# Patient Record
Sex: MALE | Race: WHITE | NOT HISPANIC OR LATINO | Employment: OTHER | URBAN - METROPOLITAN AREA
[De-identification: names, ages, dates, MRNs, and addresses within clinical notes are randomized per-mention and may not be internally consistent; named-entity substitution may affect disease eponyms.]

---

## 2021-11-16 ENCOUNTER — OFFICE VISIT (OUTPATIENT)
Dept: INTERNAL MEDICINE | Facility: CLINIC | Age: 46
End: 2021-11-16

## 2021-11-16 VITALS
HEART RATE: 84 BPM | BODY MASS INDEX: 34.07 KG/M2 | DIASTOLIC BLOOD PRESSURE: 52 MMHG | TEMPERATURE: 98.4 F | WEIGHT: 274 LBS | HEIGHT: 75 IN | SYSTOLIC BLOOD PRESSURE: 94 MMHG

## 2021-11-16 DIAGNOSIS — Z13.0 SCREENING FOR DEFICIENCY ANEMIA: ICD-10-CM

## 2021-11-16 DIAGNOSIS — Z76.89 ENCOUNTER TO ESTABLISH CARE: ICD-10-CM

## 2021-11-16 DIAGNOSIS — M54.50 LUMBAR BACK PAIN: ICD-10-CM

## 2021-11-16 DIAGNOSIS — Z13.29 SCREENING FOR ENDOCRINE DISORDER: ICD-10-CM

## 2021-11-16 DIAGNOSIS — K21.9 GASTROESOPHAGEAL REFLUX DISEASE, UNSPECIFIED WHETHER ESOPHAGITIS PRESENT: ICD-10-CM

## 2021-11-16 DIAGNOSIS — L30.9 DERMATITIS: ICD-10-CM

## 2021-11-16 DIAGNOSIS — M72.2 PLANTAR FASCIITIS: Primary | ICD-10-CM

## 2021-11-16 DIAGNOSIS — Z13.220 LIPID SCREENING: ICD-10-CM

## 2021-11-16 DIAGNOSIS — Z86.69 HX OF MIGRAINES: ICD-10-CM

## 2021-11-16 PROBLEM — M48.02 CERVICAL SPINAL STENOSIS: Status: ACTIVE | Noted: 2021-11-16

## 2021-11-16 PROBLEM — N20.0 KIDNEY STONES: Status: ACTIVE | Noted: 2021-11-16

## 2021-11-16 PROCEDURE — 99203 OFFICE O/P NEW LOW 30 MIN: CPT | Performed by: NURSE PRACTITIONER

## 2021-11-16 RX ORDER — ELETRIPTAN HYDROBROMIDE 40 MG/1
40 TABLET, FILM COATED ORAL ONCE AS NEEDED
COMMUNITY
End: 2022-05-12 | Stop reason: SDUPTHER

## 2021-11-16 RX ORDER — LORATADINE 10 MG/1
10 TABLET ORAL DAILY
Qty: 90 TABLET | Refills: 1 | Status: SHIPPED | OUTPATIENT
Start: 2021-11-16

## 2021-11-16 RX ORDER — ERENUMAB-AOOE 140 MG/ML
140 INJECTION, SOLUTION SUBCUTANEOUS
Qty: 1.12 ML | Refills: 11 | Status: SHIPPED | OUTPATIENT
Start: 2021-11-16

## 2021-11-16 RX ORDER — PANTOPRAZOLE SODIUM 20 MG/1
20 TABLET, DELAYED RELEASE ORAL 2 TIMES DAILY PRN
COMMUNITY
End: 2022-03-29 | Stop reason: SDUPTHER

## 2021-11-16 RX ORDER — ONDANSETRON 4 MG/1
4 TABLET, FILM COATED ORAL EVERY 8 HOURS PRN
Qty: 30 TABLET | Refills: 0 | Status: SHIPPED | OUTPATIENT
Start: 2021-11-16

## 2021-11-16 RX ORDER — ERENUMAB-AOOE 140 MG/ML
140 INJECTION, SOLUTION SUBCUTANEOUS
COMMUNITY
End: 2021-11-16 | Stop reason: SDUPTHER

## 2021-11-16 RX ORDER — PROMETHAZINE HYDROCHLORIDE 25 MG/1
25 TABLET ORAL EVERY 6 HOURS PRN
COMMUNITY
End: 2021-12-10 | Stop reason: SDUPTHER

## 2021-11-16 NOTE — PROGRESS NOTES
Abiel Walters  1975  [id number]  Patient Care Team:  Celina eRmy APRN as PCP - General (Internal Medicine)    Abiel Walters is a 46 y.o. here today to establish care.     Chief Complaint   Patient presents with   • Plantar Fasciitis     Patientis here to establish care        HPI:   Abiel is here to establish care.  He has been in [facility] for the past 5 years and got out 3 wks ago.  He has a pertinent medical history of plantar fasciitis, GERD, migraines, lumbar back pain, cervical stenosis, and obesity.    PF: Left heel pain started about 12 mo ago, after 6 mo had injection which gave him relief for about 4.5 mo.  Has had increased pain recently.    GERD: Initially on Protonix 40 mg and has been able to decrease Protonix to 20mg and only needs 4 d/wk right now.  Some breakthrough symptoms he treats with tums    Long standing history of migraine headaches.  Has been on Aimovig for the last 12 to 18 months which has significantly improved both the frequency and intensity of his headaches.  He was having about 20 headaches per month and now 5 to 6/month.  Uses Relpax for breakthrough.  Sometimes needs Phenergan.  Has used Zofran in the past with less sedation and good results.  If resistant to Relpax will do Toradol injection.  In the past was in the ED 2-3 times a month for migraine treatment.    He would like a prescription for an antihistamine to treat allergies.    Cervical stenosis: No past surgeries or interventions    Lumbar back pain, was previously on Percocet 10 mg 4 times daily for about 18 months before he moved present.  Has been using only Tylenol for the last 5 years, does have significant pain.  Meloxicam was not beneficial in the past but diclofenac did help.  He did have epidural injections in the past, about 6 to 7 years ago.    Rash bilat LE x 12 mo, only on lateral surfaces.  Was tx with steroid which made rash worse, then treated with antifungal which did not help.  Currently dry  no discrete lesions  Past Medical History:   Diagnosis Date   • Anxiety    • Arthritis    • Depression    • Headache      Past Surgical History:   Procedure Laterality Date   • APPENDECTOMY  1986   • CYSTOSCOPY BLADDER STONE LITHOTRIPSY  2013     Family History   Problem Relation Age of Onset   • Arthritis Mother    • Prostate cancer Father    • Migraines Maternal Grandmother    • Diabetes Paternal Grandmother    • Migraines Paternal Grandmother    • Stomach cancer Paternal Grandfather      Social History     Tobacco Use   Smoking Status Never Smoker   Smokeless Tobacco Never Used     Allergies   Allergen Reactions   • Haloperidol Other (See Comments)     Dystonic reaction   • Prochlorperazine Edisylate Unknown - Low Severity   • Sulfamethoxazole-Trimethoprim Rash   • Valproic Acid Rash       Current Outpatient Medications:   •  eletriptan (RELPAX) 40 MG tablet, Take 40 mg by mouth 1 (One) Time As Needed for Headache or Migraine. may repeat in 2 hours if necessary, Disp: , Rfl:   •  Erenumab-aooe (Aimovig) 140 MG/ML prefilled syringe, Inject 1 mL under the skin into the appropriate area as directed Every 30 (Thirty) Days., Disp: 1.12 mL, Rfl: 11  •  pantoprazole (PROTONIX) 20 MG EC tablet, Take 20 mg by mouth 2 (Two) Times a Day As Needed for Heartburn., Disp: , Rfl:   •  promethazine (PHENERGAN) 25 MG tablet, Take 25 mg by mouth Every 6 (Six) Hours As Needed for Nausea or Vomiting., Disp: , Rfl:   •  loratadine (Claritin) 10 MG tablet, Take 1 tablet by mouth Daily., Disp: 90 tablet, Rfl: 1  •  ondansetron (Zofran) 4 MG tablet, Take 1 tablet by mouth Every 8 (Eight) Hours As Needed for Nausea or Vomiting., Disp: 30 tablet, Rfl: 0  •  ubrogepant (ubrogepant) 100 MG tablet, Take 1 tablet by mouth 1 (One) Time As Needed (migraine)., Disp: 2 tablet, Rfl: 0    Review of Systems    BP 94/52 (BP Location: Left arm, Patient Position: Sitting, Cuff Size: Large Adult)   Pulse 84   Temp 98.4 °F (36.9 °C) (Temporal)   Ht  "190.5 cm (75\")   Wt 124 kg (274 lb)   BMI 34.25 kg/m²     Physical Exam    Results Review:  None         Assessment/Plan:  Patient Instructions   Problem List Items Addressed This Visit        Gastrointestinal Abdominal     Gastroesophageal reflux disease    Relevant Medications    pantoprazole (PROTONIX) 20 MG EC tablet       Musculoskeletal and Injuries    Lumbar back pain       Neuro    Hx of migraines      Other Visit Diagnoses     Plantar fasciitis    -  Primary    Encourage night splint, follow-up with podiatry, instructions below    Relevant Orders    Ambulatory Referral to Podiatry (Completed)    Dermatitis        Unclear etiology, for now monitor, apply Vaseline daily    Screening for endocrine disorder        Relevant Orders    TSH Rfx On Abnormal To Free T4    Lipid screening        Relevant Orders    Lipid Panel    Screening for deficiency anemia        Relevant Orders    CBC & Differential    Comprehensive Metabolic Panel    Encounter to establish care                 Diagnosis Plan   1. Plantar fasciitis  Ambulatory Referral to Podiatry    Encourage night splint, follow-up with podiatry, instructions below   2. Hx of migraines      Prescription for Aimovig sent, samples of Ubrelvy given.  Continue Relpax or Ubrelvy for migraines.   3. Gastroesophageal reflux disease, unspecified whether esophagitis present      Using PPI as needed   4. Lumbar back pain      Consider diclofenac after lab results are available   5. Dermatitis      Unclear etiology, for now monitor, apply Vaseline daily   6. Screening for endocrine disorder  TSH Rfx On Abnormal To Free T4   7. Lipid screening  Lipid Panel   8. Screening for deficiency anemia  CBC & Differential    Comprehensive Metabolic Panel   9. Encounter to establish care         Patient Instructions   Plantar Fasciitis    Plantar fasciitis is a painful foot condition that affects the heel. It occurs when the band of tissue that connects the toes to the heel bone " (plantar fascia) becomes irritated. This can happen as the result of exercising too much or doing other repetitive activities (overuse injury).  Plantar fasciitis can cause mild irritation to severe pain that makes it difficult to walk or move. The pain is usually worse in the morning after sleeping, or after sitting or lying down for a period of time. Pain may also be worse after long periods of walking or standing.  What are the causes?  This condition may be caused by:  · Standing for long periods of time.  · Wearing shoes that do not have good arch support.  · Doing activities that put stress on joints (high-impact activities). This includes ballet and exercise that makes your heart beat faster (aerobic exercise), such as running.  · Being overweight.  · An abnormal way of walking (gait).  · Tight muscles in the back of your lower leg (calf).  · High arches in your feet or flat feet.  · Starting a new athletic activity.  What are the signs or symptoms?  The main symptom of this condition is heel pain. Pain may get worse after the following:  · Taking the first steps after a time of rest, especially in the morning after awakening, or after you have been sitting or lying down for a while.  · Long periods of standing still.  Pain may decrease after 30-45 minutes of activity, such as gentle walking.  How is this diagnosed?  This condition may be diagnosed based on your medical history, a physical exam, and your symptoms. Your health care provider will check for:  · A tender area on the bottom of your foot.  · A high arch in your foot or flat feet.  · Pain when you move your foot.  · Difficulty moving your foot.  You may have imaging tests to confirm the diagnosis, such as:  · X-rays.  · Ultrasound.  · MRI.  How is this treated?  Treatment for plantar fasciitis depends on how severe your condition is. Treatment may include:  · Rest, ice, pressure (compression), and raising (elevating) the affected foot. This is  called RICE therapy. Your health care provider may recommend RICE therapy along with over-the-counter pain medicines to manage your pain.  · Exercises to stretch your calves and your plantar fascia.  · A splint that holds your foot in a stretched, upward position while you sleep (night splint).  · Physical therapy to relieve symptoms and prevent problems in the future.  · Injections of steroid medicine (cortisone) to relieve pain and inflammation.  · Stimulating your plantar fascia with electrical impulses (extracorporeal shock wave therapy). This is usually the last treatment option before surgery.  · Surgery, if other treatments have not worked after 12 months.  Follow these instructions at home:  Managing pain, stiffness, and swelling    · If directed, put ice on the painful area. To do this:  ? Put ice in a plastic bag, or use a frozen bottle of water.  ? Place a towel between your skin and the bag or bottle.  ? Roll the bottom of your foot over the bag or bottle.  ? Do this for 20 minutes, 2-3 times a day.  · Wear athletic shoes that have air-sole or gel-sole cushions, or try soft shoe inserts that are designed for plantar fasciitis.  · Elevate your foot above the level of your heart while you are sitting or lying down.    Activity  · Avoid activities that cause pain. Ask your health care provider what activities are safe for you.  · Do physical therapy exercises and stretches as told by your health care provider.  · Try activities and forms of exercise that are easier on your joints (low impact). Examples include swimming, water aerobics, and biking.  General instructions  · Take over-the-counter and prescription medicines only as told by your health care provider.  · Wear a night splint while sleeping, if told by your health care provider. Loosen the splint if your toes tingle, become numb, or turn cold and blue.  · Maintain a healthy weight, or work with your health care provider to lose weight as  needed.  · Keep all follow-up visits. This is important.  Contact a health care provider if you have:  · Symptoms that do not go away with home treatment.  · Pain that gets worse.  · Pain that affects your ability to move or do daily activities.  Summary  · Plantar fasciitis is a painful foot condition that affects the heel. It occurs when the band of tissue that connects the toes to the heel bone (plantar fascia) becomes irritated.  · Heel pain is the main symptom of this condition. It may get worse after exercising too much or standing still for a long time.  · Treatment varies, but it usually starts with rest, ice, pressure (compression), and raising (elevating) the affected foot. This is called RICE therapy. Over-the-counter medicines can also be used to manage pain.  This information is not intended to replace advice given to you by your health care provider. Make sure you discuss any questions you have with your health care provider.  Document Revised: 04/05/2021 Document Reviewed: 04/05/2021  [employer] Patient Education © 2021 [employer]        Plan of care reviewed with patient at the conclusion of today's visit. Education was provided regarding diagnosis and management.  Patient verbalizes understanding of and agreement with management plan.    Return in about 3 months (around 2/16/2022) for Labs this visit.    Dictated Utilizing Dragon Dictation      WANDA Che    I spent 30 minutes in patient care: Reviewing records prior to the visit, examining the patient, entering orders and documentation.

## 2021-11-23 ENCOUNTER — TELEPHONE (OUTPATIENT)
Dept: INTERNAL MEDICINE | Facility: CLINIC | Age: 46
End: 2021-11-23

## 2021-11-23 ENCOUNTER — PATIENT ROUNDING (BHMG ONLY) (OUTPATIENT)
Dept: INTERNAL MEDICINE | Facility: CLINIC | Age: 46
End: 2021-11-23

## 2021-11-23 ENCOUNTER — PRIOR AUTHORIZATION (OUTPATIENT)
Dept: INTERNAL MEDICINE | Facility: CLINIC | Age: 46
End: 2021-11-23

## 2021-11-23 NOTE — PROGRESS NOTES
Called patient & had the following conversation:    My name is Carina Ahmadi & I’m the practice manager here at [facility] with Celina Remy.    I’m calling as I see you were a new patient with our practice last week & I wanted to officially welcome you & make sure everything went smoothly for you.    Pt replied that everything was great - very pleased.  Just had one issue that didn't get resolved & I will put in a message to Celina on that & Whitney will call him back.    We ended with Happy Thanksgiving & told him to call us whenever he needed something.

## 2021-11-23 NOTE — TELEPHONE ENCOUNTER
Received PA for aimovig. Submitted through [facility] via fax. Pending response. Key: [id number]

## 2021-11-23 NOTE — TELEPHONE ENCOUNTER
Informed patient that per Dr White an albuterol inhaler was sent to [facility].  Writer instructed patient to take inhaler every 6 hours prn.  Patient verbalized understanding.   Tried to call patient with no answer or voicemail

## 2021-11-23 NOTE — TELEPHONE ENCOUNTER
I believe he had already tried an antifungal and a steroid and neither helped.  If this is psoriasis it may help to use plain Vaseline twice a day.  I would try this for at least a month first

## 2021-11-23 NOTE — TELEPHONE ENCOUNTER
"Was speaking with this patient while doing the new patient rounding calls (very nice conversation) & he voiced that there was one unresolved issue from his visit that got overlooked.    His concern is a \"flakey\" skin issue that was originally thought to be psoriasis then was thought to be fungal.   He seemed to think he was going to get some anti fungal creme prescribed.    Please let him know.  "

## 2021-12-10 ENCOUNTER — OFFICE VISIT (OUTPATIENT)
Dept: INTERNAL MEDICINE | Facility: CLINIC | Age: 46
End: 2021-12-10

## 2021-12-10 VITALS
DIASTOLIC BLOOD PRESSURE: 68 MMHG | HEIGHT: 75 IN | TEMPERATURE: 99.1 F | RESPIRATION RATE: 16 BRPM | WEIGHT: 279.4 LBS | OXYGEN SATURATION: 95 % | BODY MASS INDEX: 34.74 KG/M2 | HEART RATE: 88 BPM | SYSTOLIC BLOOD PRESSURE: 122 MMHG

## 2021-12-10 DIAGNOSIS — G43.009 MIGRAINE WITHOUT AURA AND WITHOUT STATUS MIGRAINOSUS, NOT INTRACTABLE: Chronic | ICD-10-CM

## 2021-12-10 DIAGNOSIS — Z86.69 HX OF MIGRAINES: Primary | ICD-10-CM

## 2021-12-10 PROCEDURE — 99214 OFFICE O/P EST MOD 30 MIN: CPT | Performed by: INTERNAL MEDICINE

## 2021-12-10 RX ORDER — LEVETIRACETAM 250 MG/1
TABLET ORAL
Qty: 4 TABLET | Refills: 0 | Status: SHIPPED | OUTPATIENT
Start: 2021-12-10 | End: 2022-01-19

## 2021-12-10 RX ORDER — LITHIUM CARBONATE 150 MG/1
CAPSULE ORAL
Qty: 6 CAPSULE | Refills: 0 | Status: SHIPPED | OUTPATIENT
Start: 2021-12-10 | End: 2022-01-19

## 2021-12-10 RX ORDER — METOCLOPRAMIDE 10 MG/1
10 TABLET ORAL 4 TIMES DAILY PRN
Qty: 30 TABLET | Refills: 5 | Status: SHIPPED | OUTPATIENT
Start: 2021-12-10 | End: 2022-01-19

## 2021-12-10 RX ORDER — IBUPROFEN AND FAMOTIDINE 26.6; 8 MG/1; MG/1
1 TABLET, FILM COATED ORAL 3 TIMES DAILY PRN
Qty: 3 TABLET | Refills: 0 | COMMUNITY
Start: 2021-12-10 | End: 2022-01-19

## 2021-12-10 RX ORDER — PROMETHAZINE HYDROCHLORIDE 25 MG/1
25 TABLET ORAL EVERY 6 HOURS PRN
Qty: 30 TABLET | Refills: 0 | Status: SHIPPED | OUTPATIENT
Start: 2021-12-10 | End: 2022-05-12 | Stop reason: SDUPTHER

## 2021-12-10 NOTE — PATIENT INSTRUCTIONS
Patient Instructions   Problem List Items Addressed This Visit        Neuro    Migraine without aura and without status migrainosus, not intractable (Chronic)    Overview     12/10/2021 Mavis Ortiz MD    For acute migraine, we are unable to give Toradol since we are unable to do the creatinine stat at this time of day.  We are giving him a migraine cocktail including: repeat dose of Relpax,  Duexis every 8 hours as needed,   metoclopramide with Relpax or 4 times a day as needed,   lithium 3 tablets now and 1 in the morning and 1 the next morning,   Keppra 1 tablet now and 1 in the morning and 1 the next morning,   promethazine as needed for nausea.    He was advised to drink plenty of Gatorade and stay well-hydrated.    He will continue Aimovig every 30 days for prevention.    He could benefit from taking something like trazodone or amitriptyline or nortriptyline every evening for sleep.  Celina however and can discuss that with him at his next appointment.         Relevant Medications    eletriptan (RELPAX) 40 MG tablet    Erenumab-aooe (Aimovig) 140 MG/ML prefilled syringe    ubrogepant (ubrogepant) 100 MG tablet    levETIRAcetam (Keppra) 250 MG tablet    Ibuprofen-Famotidine (Duexis) 800-26.6 MG tablet    Hx of migraines - Primary    Overview     His usual migraines are on the left side of the head but may be either side, may be with our or without, may be with nausea or without, with light sensitivity.  Patient used to have 20 acute migraines a month before he started Aimovig.  Now he has 4 or 5 a month.

## 2021-12-10 NOTE — PROGRESS NOTES
[facility]     Abiel Walters  1975   [id number]      Patient Care Team:  Celina Remy APRN as PCP - General (Internal Medicine)    Chief Complaint   Patient presents with   • Headache     x1 day frontal w/orbital pain with nausea and light sensitivity             HPI  Patient is a 46 y.o. male presents with acute migraine that was there when he woke up this morning.  Pain level is 8 out of 10.  It is left frontal with nausea and light sensitivity.  No aura this time.  He took Relpax with Excedrin first, then took ubrelvy later today.  This is only the second time he has tried Ubrelvy.  The first time he tried it the headache did go away about 3 hours later but he is unsure if it was spontaneous or secondary to Ubrelvy treatment.    Usually HA goes away in 2 hours after relpax with excedrin.  Right now pain 8/10.    No neurologic symptoms including one-sided weakness, vision changes, numbness and tingling, dysphagia or dysphasia, or confusion.    His usual migraines are on the left side of the head but may be either side, may be with our or without, may be with nausea or without, with light sensitivity.  Patient used to have 20 acute migraines a month before he started Aimovig.  Now he has 4 or 5 a month.    He has been treated in the ER in the past with Dilaudid for migraines.  He has been treated and doctor's offices in the past with Toradol, however we are unable to do Toradol now without getting a stat creatinine first which we are unable to get at this time of day.    CHRONIC CONDITIONS  He reports chronic insomnia, \"all his life\".  He does try to stay well-hydrated and drinks a lot of Gatorade.    Past Medical History:   Diagnosis Date   • Anxiety    • Arthritis    • Depression    • Headache        Past Surgical History:   Procedure Laterality Date   • APPENDECTOMY  1986   • CYSTOSCOPY BLADDER STONE LITHOTRIPSY  2013       Family History   Problem Relation Age of Onset   • Arthritis " "Mother    • Prostate cancer Father    • Migraines Maternal Grandmother    • Diabetes Paternal Grandmother    • Migraines Paternal Grandmother    • Stomach cancer Paternal Grandfather        Social History     Socioeconomic History   • Marital status:    Tobacco Use   • Smoking status: Never Smoker   • Smokeless tobacco: Never Used   Substance and Sexual Activity   • Alcohol use: Not Currently   • Drug use: Never       Allergies   Allergen Reactions   • Haloperidol Other (See Comments)     Dystonic reaction   • Prochlorperazine Edisylate Unknown - Low Severity   • Sulfamethoxazole-Trimethoprim Rash   • Valproic Acid Rash       Review of Systems:     Review of Systems   Constitutional: Negative for chills, fatigue and fever.   HENT: Negative for congestion, ear pain and sinus pressure.    Respiratory: Negative for cough, chest tightness, shortness of breath and wheezing.    Cardiovascular: Negative for chest pain, palpitations and leg swelling.   Gastrointestinal: Positive for nausea. Negative for abdominal pain, blood in stool and constipation.   Skin: Negative for color change.   Allergic/Immunologic: Negative for environmental allergies.   Neurological: Positive for headache. Negative for dizziness, speech difficulty, weakness, numbness and memory problem.   Psychiatric/Behavioral: Negative for decreased concentration. The patient is not nervous/anxious.        Vital Signs  Vitals:    12/10/21 1626   BP: 122/68   BP Location: Left arm   Patient Position: Sitting   Cuff Size: Large Adult   Pulse: 88   Resp: 16   Temp: 99.1 °F (37.3 °C)   TempSrc: Infrared   SpO2: 95%   Weight: 127 kg (279 lb 6.4 oz)   Height: 190.5 cm (75\")   PainSc:   8   PainLoc: Head     Body mass index is 34.92 kg/m².  Patient's Body mass index is 34.92 kg/m². indicating that he is obese (BMI >30). Obesity-related health conditions include the following: non. Obesity is unchanged. BMI is is above average; BMI management plan is " completed. We discussed low calorie, low carb based diet program, portion control and increasing exercise..        Current Outpatient Medications:   •  eletriptan (RELPAX) 40 MG tablet, Take 40 mg by mouth 1 (One) Time As Needed for Headache or Migraine. may repeat in 2 hours if necessary, Disp: , Rfl:   •  Erenumab-aooe (Aimovig) 140 MG/ML prefilled syringe, Inject 1 mL under the skin into the appropriate area as directed Every 30 (Thirty) Days., Disp: 1.12 mL, Rfl: 11  •  loratadine (Claritin) 10 MG tablet, Take 1 tablet by mouth Daily., Disp: 90 tablet, Rfl: 1  •  ondansetron (Zofran) 4 MG tablet, Take 1 tablet by mouth Every 8 (Eight) Hours As Needed for Nausea or Vomiting., Disp: 30 tablet, Rfl: 0  •  pantoprazole (PROTONIX) 20 MG EC tablet, Take 20 mg by mouth 2 (Two) Times a Day As Needed for Heartburn., Disp: , Rfl:   •  ubrogepant (ubrogepant) 100 MG tablet, Take 1 tablet by mouth 1 (One) Time As Needed (migraine)., Disp: 2 tablet, Rfl: 0  •  Ibuprofen-Famotidine (Duexis) 800-26.6 MG tablet, Take 1 tablet by mouth 3 (Three) Times a Day As Needed (migraine)., Disp: 3 tablet, Rfl: 0  •  levETIRAcetam (Keppra) 250 MG tablet, Take 2 now then 1 in am and 1 the next am, Disp: 4 tablet, Rfl: 0  •  lithium carbonate 150 MG capsule, Take 3 now,2 in am, 1 the next am, Disp: 6 capsule, Rfl: 0  •  metoclopramide (Reglan) 10 MG tablet, Take 1 tablet by mouth 4 (Four) Times a Day As Needed (migraine)., Disp: 30 tablet, Rfl: 5  •  promethazine (PHENERGAN) 25 MG tablet, Take 1 tablet by mouth Every 6 (Six) Hours As Needed for Nausea or Vomiting., Disp: 30 tablet, Rfl: 0    Physical Exam:    Physical Exam  Vitals and nursing note reviewed.   Constitutional:       General: He is not in acute distress.     Appearance: He is well-developed. He is obese.   HENT:      Head: Normocephalic.   Eyes:      Conjunctiva/sclera: Conjunctivae normal.      Pupils: Pupils are equal, round, and reactive to light.   Neck:      Thyroid: No  thyromegaly.   Cardiovascular:      Rate and Rhythm: Normal rate and regular rhythm.      Heart sounds: Normal heart sounds.   Pulmonary:      Effort: Pulmonary effort is normal.      Breath sounds: Normal breath sounds. No wheezing.   Musculoskeletal:         General: Normal range of motion.      Cervical back: Normal range of motion and neck supple.   Lymphadenopathy:      Cervical: No cervical adenopathy.   Skin:     General: Skin is warm and dry.   Neurological:      Mental Status: He is alert and oriented to person, place, and time.      Cranial Nerves: Cranial nerves are intact.      Sensory: Sensation is intact.      Motor: Motor function is intact.      Gait: Gait is intact.   Psychiatric:         Attention and Perception: Attention normal.         Mood and Affect: Mood normal.         Speech: Speech normal.         Thought Content: Thought content normal.         Judgment: Judgment normal.          ACE III MINI        Results Review:    None    CMP:     HbA1c:  No results found for: HGBA1C  Microalbumin:  No results found for: MICROALBUR, POCMALB, POCCREAT  Lipid Panel  No results found for: CHOL, TRIG, HDL, LDL, AST, ALT    Medication Review: Medications reviewed and noted  Patient Instructions   Problem List Items Addressed This Visit        Neuro    Migraine without aura and without status migrainosus, not intractable (Chronic)    Overview     12/10/2021 Mavis Ortiz MD    For acute migraine, we are unable to give Toradol since we are unable to do the creatinine stat at this time of day.  We are giving him a migraine cocktail including: repeat dose of Relpax,  Duexis every 8 hours as needed,   metoclopramide with Relpax or 4 times a day as needed,   lithium 3 tablets now and 1 in the morning and 1 the next morning,   Keppra 1 tablet now and 1 in the morning and 1 the next morning,   promethazine as needed for nausea.    He was advised to drink plenty of Gatorade and stay well-hydrated.    He will  continue Aimovig every 30 days for prevention.    He could benefit from taking something like trazodone or amitriptyline or nortriptyline every evening for sleep.  Celina however and can discuss that with him at his next appointment.         Relevant Medications    eletriptan (RELPAX) 40 MG tablet    Erenumab-aooe (Aimovig) 140 MG/ML prefilled syringe    ubrogepant (ubrogepant) 100 MG tablet    levETIRAcetam (Keppra) 250 MG tablet    Ibuprofen-Famotidine (Duexis) 800-26.6 MG tablet    Hx of migraines - Primary    Overview     His usual migraines are on the left side of the head but may be either side, may be with our or without, may be with nausea or without, with light sensitivity.  Patient used to have 20 acute migraines a month before he started Aimovig.  Now he has 4 or 5 a month.                  Diagnosis Plan   1. Hx of migraines     2. Migraine without aura and without status migrainosus, not intractable             Plan of care reviewed with patient at the conclusion of today's visit. Education was provided regarding diagnosis, management, and any prescribed or recommended OTC medications.Patient verbalizes understanding of and agreement with management plan.         Mavis Ortiz MD

## 2021-12-13 ENCOUNTER — HOSPITAL ENCOUNTER (EMERGENCY)
Facility: HOSPITAL | Age: 46
Discharge: HOME | End: 2021-12-13
Attending: EMERGENCY MEDICINE | Admitting: EMERGENCY MEDICINE

## 2021-12-13 VITALS
HEIGHT: 76 IN | SYSTOLIC BLOOD PRESSURE: 130 MMHG | BODY MASS INDEX: 33.49 KG/M2 | RESPIRATION RATE: 17 BRPM | HEART RATE: 69 BPM | OXYGEN SATURATION: 99 % | WEIGHT: 275 LBS | TEMPERATURE: 98.6 F | DIASTOLIC BLOOD PRESSURE: 97 MMHG

## 2021-12-13 DIAGNOSIS — G43.909 MIGRAINE WITHOUT STATUS MIGRAINOSUS, NOT INTRACTABLE, UNSPECIFIED MIGRAINE TYPE: Primary | ICD-10-CM

## 2021-12-13 DIAGNOSIS — H53.143 PHOTOPHOBIA OF BOTH EYES: ICD-10-CM

## 2021-12-13 DIAGNOSIS — F41.9 ANXIETY AND DEPRESSION: ICD-10-CM

## 2021-12-13 DIAGNOSIS — F43.9 STRESS: ICD-10-CM

## 2021-12-13 DIAGNOSIS — F32.A ANXIETY AND DEPRESSION: ICD-10-CM

## 2021-12-13 DIAGNOSIS — R11.2 NON-INTRACTABLE VOMITING WITH NAUSEA, UNSPECIFIED VOMITING TYPE: ICD-10-CM

## 2021-12-13 PROCEDURE — 25010000002 DIPHENHYDRAMINE PER 50 MG: Performed by: PHYSICIAN ASSISTANT

## 2021-12-13 PROCEDURE — 96375 TX/PRO/DX INJ NEW DRUG ADDON: CPT

## 2021-12-13 PROCEDURE — 96374 THER/PROPH/DIAG INJ IV PUSH: CPT

## 2021-12-13 PROCEDURE — 25010000002 DEXAMETHASONE PER 1 MG: Performed by: PHYSICIAN ASSISTANT

## 2021-12-13 PROCEDURE — 99282 EMERGENCY DEPT VISIT SF MDM: CPT

## 2021-12-13 PROCEDURE — 25010000002 ONDANSETRON PER 1 MG: Performed by: PHYSICIAN ASSISTANT

## 2021-12-13 PROCEDURE — 25010000002 KETOROLAC TROMETHAMINE PER 15 MG: Performed by: PHYSICIAN ASSISTANT

## 2021-12-13 PROCEDURE — 25010000002 METOCLOPRAMIDE PER 10 MG: Performed by: PHYSICIAN ASSISTANT

## 2021-12-13 RX ORDER — METOCLOPRAMIDE HYDROCHLORIDE 5 MG/ML
10 INJECTION INTRAMUSCULAR; INTRAVENOUS ONCE
Status: COMPLETED | OUTPATIENT
Start: 2021-12-13 | End: 2021-12-13

## 2021-12-13 RX ORDER — ONDANSETRON 2 MG/ML
4 INJECTION INTRAMUSCULAR; INTRAVENOUS ONCE
Status: COMPLETED | OUTPATIENT
Start: 2021-12-13 | End: 2021-12-13

## 2021-12-13 RX ORDER — DIPHENHYDRAMINE HYDROCHLORIDE 50 MG/ML
25 INJECTION INTRAMUSCULAR; INTRAVENOUS ONCE
Status: COMPLETED | OUTPATIENT
Start: 2021-12-13 | End: 2021-12-13

## 2021-12-13 RX ORDER — KETOROLAC TROMETHAMINE 30 MG/ML
30 INJECTION, SOLUTION INTRAMUSCULAR; INTRAVENOUS ONCE
Status: COMPLETED | OUTPATIENT
Start: 2021-12-13 | End: 2021-12-13

## 2021-12-13 RX ORDER — IBUPROFEN AND FAMOTIDINE 26.6; 8 MG/1; MG/1
1 TABLET, FILM COATED ORAL 3 TIMES DAILY PRN
Qty: 3 TABLET | Refills: 0 | Status: CANCELLED | OUTPATIENT
Start: 2021-12-13

## 2021-12-13 RX ORDER — DEXAMETHASONE SODIUM PHOSPHATE 4 MG/ML
8 INJECTION, SOLUTION INTRA-ARTICULAR; INTRALESIONAL; INTRAMUSCULAR; INTRAVENOUS; SOFT TISSUE ONCE
Status: COMPLETED | OUTPATIENT
Start: 2021-12-13 | End: 2021-12-13

## 2021-12-13 RX ADMIN — DIPHENHYDRAMINE HYDROCHLORIDE 25 MG: 50 INJECTION INTRAMUSCULAR; INTRAVENOUS at 21:13

## 2021-12-13 RX ADMIN — ONDANSETRON 4 MG: 2 INJECTION INTRAMUSCULAR; INTRAVENOUS at 21:13

## 2021-12-13 RX ADMIN — SODIUM CHLORIDE 1000 ML: 9 INJECTION, SOLUTION INTRAVENOUS at 21:14

## 2021-12-13 RX ADMIN — KETOROLAC TROMETHAMINE 30 MG: 30 INJECTION, SOLUTION INTRAMUSCULAR; INTRAVENOUS at 21:12

## 2021-12-13 RX ADMIN — METOCLOPRAMIDE 10 MG: 5 INJECTION, SOLUTION INTRAMUSCULAR; INTRAVENOUS at 21:12

## 2021-12-13 RX ADMIN — DEXAMETHASONE SODIUM PHOSPHATE 8 MG: 4 INJECTION, SOLUTION INTRAMUSCULAR; INTRAVENOUS at 21:12

## 2021-12-13 NOTE — TELEPHONE ENCOUNTER
Just verifying if IBUPROFEN-FAMOTIDINE 800-26.6 MG PO TABS   were supposed to be samples or sent to pt's pharmacy [address] - please advise

## 2021-12-14 NOTE — ED PROVIDER NOTES
Subjective   This is a 46-year-old male that presents the ER with migraine headache for the last 4 days.  Patient says initially headache was left parietal and now it has migrated to right parietal.  He describes it as constant and throbbing.  He denies any radiation to his neck.  He says current headache is typical of his usual migraines.  He has tried prescription of Relpax, Zofran, and OTC Excedrin Migraine and ibuprofen without any relief.  He reports photophobia and started having nausea with vomiting earlier today.  Patient vomited 3 times.  He denies any visual changes, dizziness, near-syncope, or syncope.  Patient has followed with neurology in the past in [place] for migraines, but he no longer sees a neurologist.  He says that when he first started having migraines, headaches were debilitating and he was experiencing migraines at least 20 days/month.  He currently utilizes Relpax and Amovig injections once monthly, and this has significantly reduced his migraines to around 4 or 5 days/month.  Patient says that he has had recent increased stress, which usually is a trigger for his migraines.  Past medical history is also significant for anxiety/depression, migraine headaches, and GERD.      History provided by:  Patient  Headache  Pain location:  R parietal  Quality: constant throbbing.  Radiates to:  Does not radiate  Duration:  4 days  Timing:  Constant  Progression:  Unchanged  Chronicity:  Recurrent  Similar to prior headaches: yes (History of migraine headaches.  The current sxs feel similar.)    Context comment:  Pt reports history of migraine headaches.  He reports 4 day h/o migraine.  It was initially left sided and now right sided; throbbing.  Photophobia.  Emesis x 3.  No visual changes.  No dizziness, near syncope.  Relieved by:  Nothing  Worsened by:  Nothing  Ineffective treatments:  NSAIDs and prescription medications (Pt tried Relpax, Zofran, Excedrin Migraine, Ibuprofen.)  Associated  symptoms: nausea, photophobia and vomiting (Emesis x 3 today)    Associated symptoms: no abdominal pain, no back pain, no blurred vision, no congestion, no dizziness, no ear pain, no eye pain, no facial pain, no fatigue, no focal weakness, no loss of balance, no near-syncope, no neck pain, no neck stiffness, no numbness, no paresthesias and no visual change    Risk factors comment:  Pt used to follow with Sandy Lopez, but now his PCP prescribes Relpax and Amovig once a month.  Pt says usually migraines 4-5 times a month.  Previously, pt had 20 days a migraines per month.  Amovig really improved symptoms of frequent migraine      Review of Systems   Constitutional: Positive for appetite change. Negative for fatigue.   HENT: Negative for congestion, dental problem and ear pain.    Eyes: Positive for photophobia. Negative for blurred vision, pain and visual disturbance.   Respiratory: Negative.    Cardiovascular: Negative for near-syncope.   Gastrointestinal: Positive for nausea and vomiting (Emesis x 3 today). Negative for abdominal pain.   Genitourinary: Negative.    Musculoskeletal: Negative for back pain, neck pain and neck stiffness.   Neurological: Positive for headaches (Recurrent migraines.  Current HA has been 4 days.  Increased right parietal today.). Negative for dizziness, focal weakness, numbness, paresthesias and loss of balance.   Psychiatric/Behavioral:        Increased stress recently, which is usually a trigger for migraines.   All other systems reviewed and are negative.      Past Medical History:   Diagnosis Date   • Anxiety    • Arthritis    • Depression    • Headache        Allergies   Allergen Reactions   • Haloperidol Other (See Comments)     Dystonic reaction   • Prochlorperazine Edisylate Unknown - Low Severity   • Sulfamethoxazole-Trimethoprim Rash   • Valproic Acid Rash       Past Surgical History:   Procedure Laterality Date   • APPENDECTOMY  1986   • CYSTOSCOPY BLADDER STONE  LITHOTRIPSY  2013       Family History   Problem Relation Age of Onset   • Arthritis Mother    • Prostate cancer Father    • Migraines Maternal Grandmother    • Diabetes Paternal Grandmother    • Migraines Paternal Grandmother    • Stomach cancer Paternal Grandfather        Social History     Socioeconomic History   • Marital status:    Tobacco Use   • Smoking status: Never Smoker   • Smokeless tobacco: Never Used   Substance and Sexual Activity   • Alcohol use: Not Currently   • Drug use: Never           Objective   Physical Exam  Vitals and nursing note reviewed.   Constitutional:       Appearance: Normal appearance.   HENT:      Head: Normocephalic and atraumatic.      Right Ear: Tympanic membrane normal.      Left Ear: Tympanic membrane normal.      Nose: Nose normal.      Mouth/Throat:      Mouth: Mucous membranes are moist.      Pharynx: Oropharynx is clear.   Eyes:      Extraocular Movements: Extraocular movements intact.      Right eye: Normal extraocular motion and no nystagmus.      Left eye: Normal extraocular motion and no nystagmus.      Conjunctiva/sclera: Conjunctivae normal.      Pupils: Pupils are equal, round, and reactive to light.      Comments: Pupils equal round reactive to light.  Extraocular movements intact.  No nystagmus.  Positive photophobia with bilateral eyes.   Neck:      Meningeal: Brudzinski's sign and Kernig's sign absent.      Comments: No C-spine tenderness.  Full range of motion.  No meningeal signs.  Cardiovascular:      Rate and Rhythm: Normal rate and regular rhythm.  No extrasystoles are present.     Pulses: Normal pulses.      Heart sounds: Normal heart sounds.      Comments: Regular rate and rhythm.  No ectopy.  Pulmonary:      Effort: Pulmonary effort is normal.      Breath sounds: Normal breath sounds.   Abdominal:      General: Bowel sounds are normal.      Palpations: Abdomen is soft.   Musculoskeletal:         General: Normal range of motion.      Cervical  back: Normal range of motion and neck supple. No spinous process tenderness or muscular tenderness.   Skin:     General: Skin is warm and dry.   Neurological:      General: No focal deficit present.      Mental Status: He is alert.      Cranial Nerves: Cranial nerves are intact.      Sensory: Sensation is intact.      Motor: Motor function is intact.      Coordination: Coordination is intact.      Comments: Neuro intact and nonfocal.         Procedures           ED Course  ED Course as of 12/13/21 2220   Mon Dec 13, 2021   2056 Patient says his current headache is typical of his chronic migraines.  He started vomiting today and was unable to keep his prescription meds down as well as OTC meds.  He does request follow-up information for neurologist at [facility] so he can get plugged in for long-term management of migraines.  Vital signs and neurologic exam are within normal limits. [FC]   2217 Upon reassessment after IV fluids and migraine cocktail, patient feels markedly improved.  He expresses readiness to be discharged home.  I will give him close follow-up information for neurology, Dr. Lofton, for long-term management of migraine headaches.  Vital signs and exam are stable.  Return to the ER if worsening symptoms. [FC]      ED Course User Index  [FC] Nancy Barrios, ARTEMIO            No results found for this or any previous visit (from the past 24 hour(s)).  Note: In addition to lab results from this visit, the labs listed above may include labs taken at another facility or during a different encounter within the last 24 hours. Please correlate lab times with ED admission and discharge times for further clarification of the services performed during this visit.    No orders to display     Vitals:    12/13/21 1717 12/13/21 2026   BP: 121/83 130/97   BP Location: Left arm Left arm   Patient Position: Sitting Sitting   Pulse: 78 69   Resp: 17    Temp: 98.6 °F (37 °C)    TempSrc: Tympanic    SpO2: 98% 99%  "  Weight: 125 kg (275 lb)    Height: 193 cm (76\")      Medications   sodium chloride 0.9 % bolus 1,000 mL (1,000 mL Intravenous New Bag 12/13/21 2114)   metoclopramide (REGLAN) injection 10 mg (10 mg Intravenous Given 12/13/21 2112)   ondansetron (ZOFRAN) injection 4 mg (4 mg Intravenous Given 12/13/21 2113)   diphenhydrAMINE (BENADRYL) injection 25 mg (25 mg Intravenous Given 12/13/21 2113)   ketorolac (TORADOL) injection 30 mg (30 mg Intravenous Given 12/13/21 2112)   dexamethasone (DECADRON) injection 8 mg (8 mg Intravenous Given 12/13/21 2112)     ECG/EMG Results (last 24 hours)     ** No results found for the last 24 hours. **        No orders to display                                               MDM    Final diagnoses:   Migraine without status migrainosus, not intractable, unspecified migraine type   Photophobia of both eyes   Non-intractable vomiting with nausea, unspecified vomiting type   Stress   Anxiety and depression       ED Disposition  ED Disposition     ED Disposition Condition Comment    Discharge Stable           Julio Lofton MD  [address] [address] [phone]    Call in 1 day  Call in the morning for first available recheck for long-term management of migraine headaches    [facility] Emergency Department  [address] [facility] [address] [phone]    If symptoms worsen         Medication List      No changes were made to your prescriptions during this visit.          Nancy Barrios PA-C  12/13/21 2220    "

## 2021-12-14 NOTE — DISCHARGE INSTRUCTIONS
ER evaluation revealed migraine headache with history of chronic migraines.  Vital signs and neurologic exam are stable.  Headache improved after IV fluid bolus and migraine cocktail.  Recommend first available recheck with neurology, Dr. Lofton for long-term management of migraine headaches.  Continue with all other current medical management.  Increase fluid intake.  Continue with all other current medical management.

## 2022-01-19 ENCOUNTER — TELEMEDICINE (OUTPATIENT)
Dept: INTERNAL MEDICINE | Facility: CLINIC | Age: 47
End: 2022-01-19

## 2022-01-19 DIAGNOSIS — M54.2 NECK PAIN: Primary | ICD-10-CM

## 2022-01-19 PROCEDURE — 99441 PR PHYS/QHP TELEPHONE EVALUATION 5-10 MIN: CPT | Performed by: NURSE PRACTITIONER

## 2022-01-19 RX ORDER — CYCLOBENZAPRINE HCL 10 MG
10 TABLET ORAL 3 TIMES DAILY PRN
Qty: 30 TABLET | Refills: 0 | Status: SHIPPED | OUTPATIENT
Start: 2022-01-19

## 2022-01-19 NOTE — PROGRESS NOTES
Abiel Walters  1975  [id number]  Patient Care Team:  Celina Remy APRN as PCP - General (Internal Medicine)    Abiel Walters is a pleasant 46 y.o. male who presents for evaluation of Neck Pain    This telephone visit occurred during the Coronavirus (Covid-19) public health emergency.  Time spent was approximately 10 minutes.  Patient identity has been confirmed using name and date of birth.  I discussed with the patient the nature of our telemedicine visits that:  --I would evaluate the patient and recommend diagnostics and treatment based on my assessment.  --Our sessions are not being recorded in the personal health information is protected.  --Our team would provide follow-up care in person if/when needed.  You have chosen to receive care through a telehealth visit.  Do you consent to use a video/audio connection for your medical care today? Yes    Chief Complaint   Patient presents with   • Neck Pain       HPI:   Neck pain:Having posterior neck muscle spasm that radiates into right shoulder x 3 days. Using back massager, tylenol, diclofenac.  No known trigger.  Has had this happened in the past, typically a couple of times per year.  Past Medical History:   Diagnosis Date   • Anxiety    • Arthritis    • Depression    • Headache      Past Surgical History:   Procedure Laterality Date   • APPENDECTOMY  1986   • CYSTOSCOPY BLADDER STONE LITHOTRIPSY  2013     Family History   Problem Relation Age of Onset   • Arthritis Mother    • Prostate cancer Father    • Migraines Maternal Grandmother    • Diabetes Paternal Grandmother    • Migraines Paternal Grandmother    • Stomach cancer Paternal Grandfather      Social History     Tobacco Use   Smoking Status Never Smoker   Smokeless Tobacco Never Used     Allergies   Allergen Reactions   • Haloperidol Other (See Comments)     Dystonic reaction   • Prochlorperazine Edisylate Unknown - Low Severity   • Sulfamethoxazole-Trimethoprim Rash   • Valproic Acid Rash        Current Outpatient Medications:   •  eletriptan (RELPAX) 40 MG tablet, Take 40 mg by mouth 1 (One) Time As Needed for Headache or Migraine. may repeat in 2 hours if necessary, Disp: , Rfl:   •  Erenumab-aooe (Aimovig) 140 MG/ML prefilled syringe, Inject 1 mL under the skin into the appropriate area as directed Every 30 (Thirty) Days., Disp: 1.12 mL, Rfl: 11  •  loratadine (Claritin) 10 MG tablet, Take 1 tablet by mouth Daily., Disp: 90 tablet, Rfl: 1  •  ondansetron (Zofran) 4 MG tablet, Take 1 tablet by mouth Every 8 (Eight) Hours As Needed for Nausea or Vomiting., Disp: 30 tablet, Rfl: 0  •  pantoprazole (PROTONIX) 20 MG EC tablet, Take 20 mg by mouth 2 (Two) Times a Day As Needed for Heartburn., Disp: , Rfl:   •  promethazine (PHENERGAN) 25 MG tablet, Take 1 tablet by mouth Every 6 (Six) Hours As Needed for Nausea or Vomiting., Disp: 30 tablet, Rfl: 0  •  ubrogepant (ubrogepant) 100 MG tablet, Take 1 tablet by mouth 1 (One) Time As Needed (migraine)., Disp: 2 tablet, Rfl: 0  •  cyclobenzaprine (FLEXERIL) 10 MG tablet, Take 1 tablet by mouth 3 (Three) Times a Day As Needed for Muscle Spasms., Disp: 30 tablet, Rfl: 0    Review of Systems  There were no vitals taken for this visit.    Physical Exam    Procedures    Results Review:  I reviewed the patient's new clinical results.    PHQ-9 Total Score:      Assessment/Plan:  Diagnoses and all orders for this visit:    1. Neck pain (Primary)  Comments:  Use Duexis 3 times daily if you have any left, otherwise continue diclofenac.  Moist heat 30 minutes 3TID followed by gentle ROM exercises, Flexeril prn    Other orders  -     cyclobenzaprine (FLEXERIL) 10 MG tablet; Take 1 tablet by mouth 3 (Three) Times a Day As Needed for Muscle Spasms.  Dispense: 30 tablet; Refill: 0       There are no Patient Instructions on file for this visit.  Plan of care reviewed with patient at the conclusion of today's visit. Education was provided regarding diagnosis, management and  any prescribed or recommended OTC medications.  Patient verbalizes understanding of and agreement with management plan.    No follow-ups on file.    Dictated Utilizing Dragon Dictation.    Celina Remy, APRN

## 2022-02-28 ENCOUNTER — TELEPHONE (OUTPATIENT)
Dept: INTERNAL MEDICINE | Facility: CLINIC | Age: 47
End: 2022-02-28

## 2022-03-01 NOTE — TELEPHONE ENCOUNTER
PT MOTHER IS ON THE PT CONTACT, HOWEVER WE DO NOT HAVE A LELIA FORM WITH HER NAME ON IT. IM NOT SURE WHERE THESE FORMS ARE HOWEVER I WILL LOOK FOR THEM.

## 2022-03-01 NOTE — TELEPHONE ENCOUNTER
Thank you!  please update her that I cannot talk to her directly unless I have a release of information.

## 2022-03-03 ENCOUNTER — TELEPHONE (OUTPATIENT)
Dept: INTERNAL MEDICINE | Facility: CLINIC | Age: 47
End: 2022-03-03

## 2022-03-03 NOTE — TELEPHONE ENCOUNTER
Caller: Abiel Walters    Relationship: Self    Best call back number: [phone]    What is the best time to reach you: ANY    Who are you requesting to speak with (clinical staff, provider,  specific staff member): ANY    What was the call regarding: WANTS TO VERIFY THAT WE RECEIVED THE HIPPA FORM FOR HIS MEDICAL RECORDS THAT WAS FAXED. SON IS INCARCERATED IN [facility] half-way- half-way FAXED IT. PATIENT IS OUT OF MEDICATION AND NEEDS REFILLS- half-way WILL NOT FILL WITHOUT THE HIPPA FORM    Do you require a callback: YES, PLEASE    BH VERBAL NOT IN CHART  MOTHER REQUESTING A CALL BACK

## 2022-03-04 NOTE — TELEPHONE ENCOUNTER
Called Mrs Walters & let her know we have not received any forms.  Gave her our fax number for verification.

## 2022-03-29 ENCOUNTER — TELEPHONE (OUTPATIENT)
Dept: INTERNAL MEDICINE | Facility: CLINIC | Age: 47
End: 2022-03-29

## 2022-03-29 RX ORDER — PANTOPRAZOLE SODIUM 20 MG/1
20 TABLET, DELAYED RELEASE ORAL 2 TIMES DAILY PRN
Qty: 60 TABLET | Refills: 5 | Status: SHIPPED | OUTPATIENT
Start: 2022-03-29

## 2022-03-29 NOTE — TELEPHONE ENCOUNTER
Caller: ROSE MARIE HOPKINS    Relationship: Mother    Best call back number: [phone]     What is the best time to reach you: PATIENT WILL NOT BE AVAILABLE WITH IN THE NEXT HOUR AND TOMORROW MORNING.     Who are you requesting to speak with (clinical staff, provider,  specific staff member): NURSE      What was the call regarding: PATIENT'S MOTHER CALLED TO SPEAK WITH A NURSE.  SHE HAS SOME QUESTIONS ABOUT THE PATIENT'S MEDICATIONS.     Do you require a callback: YES

## 2022-03-29 NOTE — TELEPHONE ENCOUNTER
Advised patients mom we are unable to speak with her since she is not on verbal release. She stated the patient just needs a refill of protonix sent to [facility]. Patient is still in [facility].

## 2022-03-30 ENCOUNTER — PRIOR AUTHORIZATION (OUTPATIENT)
Dept: INTERNAL MEDICINE | Facility: CLINIC | Age: 47
End: 2022-03-30

## 2022-04-21 ENCOUNTER — TELEPHONE (OUTPATIENT)
Dept: INTERNAL MEDICINE | Facility: CLINIC | Age: 47
End: 2022-04-21

## 2022-05-12 RX ORDER — ELETRIPTAN HYDROBROMIDE 40 MG/1
40 TABLET, FILM COATED ORAL ONCE AS NEEDED
Qty: 7 TABLET | Refills: 1 | Status: SHIPPED | OUTPATIENT
Start: 2022-05-12

## 2022-05-12 RX ORDER — PROMETHAZINE HYDROCHLORIDE 25 MG/1
25 TABLET ORAL EVERY 6 HOURS PRN
Qty: 30 TABLET | Refills: 0 | Status: SHIPPED | OUTPATIENT
Start: 2022-05-12

## 2022-05-12 NOTE — TELEPHONE ENCOUNTER
Caller: ROSE MARIE HOPKINS    Relationship: Mother    Best call back number: [phone]    Requested Prescriptions:   Requested Prescriptions     Pending Prescriptions Disp Refills   • promethazine (PHENERGAN) 25 MG tablet 30 tablet 0     Sig: Take 1 tablet by mouth Every 6 (Six) Hours As Needed for Nausea or Vomiting.   • eletriptan (RELPAX) 40 MG tablet       Sig: Take 1 tablet by mouth 1 (One) Time As Needed for Headache or Migraine. may repeat in 2 hours if necessary        Pharmacy where request should be sent: [id number] [place], [place] - [address]  - [phone]  - [phone] FX     Additional details provided by patient: ANTONELLA STATES THE PHENEGRAN WAS PRESCRIBED BY DEPT OF CORRECTIONS. PATIENT HAS 1 RELPAX LEFT. ANTONELLA REQUESTED A CALL BACK WHEN THESE ARE SENT    Does the patient have less than a 3 day supply:  [x] Yes  [] No    Sandra Sandoval Rep   05/12/22 13:03 EDT

## 2022-05-12 NOTE — TELEPHONE ENCOUNTER
Rx Refill Note  Requested Prescriptions     Pending Prescriptions Disp Refills   • promethazine (PHENERGAN) 25 MG tablet 30 tablet 0     Sig: Take 1 tablet by mouth Every 6 (Six) Hours As Needed for Nausea or Vomiting.   • eletriptan (RELPAX) 40 MG tablet       Sig: Take 1 tablet by mouth 1 (One) Time As Needed for Headache or Migraine. may repeat in 2 hours if necessary      Last office visit with prescribing clinician:  12/10/21   Next office visit with prescribing clinician: none           Elizabeth Nelson RN  05/12/22, 14:09 EDT

## 2022-05-13 ENCOUNTER — PRIOR AUTHORIZATION (OUTPATIENT)
Dept: INTERNAL MEDICINE | Facility: CLINIC | Age: 47
End: 2022-05-13

## 2022-05-13 NOTE — TELEPHONE ENCOUNTER
Received PA for relpax. Submitted through [employer] via [employer]. Pending response. Key: [id number]

## 2022-05-13 NOTE — TELEPHONE ENCOUNTER
MOTHER CALLED BACK TO CHECK STATUS OF MEDICATION REFILL. ASKING FOR A CALL BACK- MUST  TODAY.  [phone] ANTONELLA HOPKINS.    PATIENT IS INCARCERATED AND THESE MUST BE DELIVERED TO HIM. PLEASE CALL MOTHER

## 2022-05-17 ENCOUNTER — TELEPHONE (OUTPATIENT)
Dept: INTERNAL MEDICINE | Facility: CLINIC | Age: 47
End: 2022-05-17

## 2022-05-17 NOTE — TELEPHONE ENCOUNTER
Pharmacy requesting PA for Eletriptan 40 mg 6 Tabs    Price with good Rx is 96.82    PA Ref # [id number]     Would you like PA done ? If so what's the Dx?

## 2022-05-17 NOTE — TELEPHONE ENCOUNTER
Patients mother called because there was an issue with [facility] needing additional information in regards to an eletriptan prescription.     She stated that when a friend went to fill the prescription [facility] had told them that they could not until they obtained additional information, but she was not sure exactly what was needed.    I see attached to this call is a note with a Prior Auth for the same medication. Please advise, call back needed

## 2022-05-19 ENCOUNTER — TELEPHONE (OUTPATIENT)
Dept: INTERNAL MEDICINE | Facility: CLINIC | Age: 47
End: 2022-05-19

## 2022-05-19 NOTE — TELEPHONE ENCOUNTER
Spoke with patients mother and explained that PA has been sent just waiting on pt insurance to approve the medication.

## 2022-05-19 NOTE — TELEPHONE ENCOUNTER
PT MOTHER CALLED TO CHECK ON STATUS OF PRIOR AUTH FOR RX   eletriptan (RELPAX) 40 MG tablet     .  STATED THAT PT ON LY HAS 1 TABLET LEFT.    PLEASE ADVISE.  CALL BACK:[phone]

## 2022-05-27 NOTE — TELEPHONE ENCOUNTER
Patients mom called asking about an update on PA. I advised her it was submitted 05/12/22 and could potentially take up to 30 days. I did advise her of good rx.

## 2022-05-27 NOTE — TELEPHONE ENCOUNTER
After spending 45 mins on the phone with insurance I found out the PA was approved 05/19/2022 and is good through 05/18/2023. Mom verbalized understanding.